# Patient Record
Sex: FEMALE
[De-identification: names, ages, dates, MRNs, and addresses within clinical notes are randomized per-mention and may not be internally consistent; named-entity substitution may affect disease eponyms.]

---

## 2017-03-25 ENCOUNTER — APPOINTMENT (OUTPATIENT)
Dept: FAMILY MEDICINE | Facility: CLINIC | Age: 27
End: 2017-03-25

## 2017-03-25 VITALS
OXYGEN SATURATION: 98 % | WEIGHT: 140 LBS | BODY MASS INDEX: 22.5 KG/M2 | HEART RATE: 89 BPM | HEIGHT: 66 IN | RESPIRATION RATE: 12 BRPM | SYSTOLIC BLOOD PRESSURE: 118 MMHG | DIASTOLIC BLOOD PRESSURE: 70 MMHG

## 2017-03-25 RX ORDER — HYDROCORTISONE 1 %
12 CREAM (GRAM) TOPICAL TWICE DAILY
Qty: 1 | Refills: 5 | Status: ACTIVE | COMMUNITY
Start: 2017-03-25 | End: 1900-01-01

## 2017-04-17 ENCOUNTER — OTHER (OUTPATIENT)
Age: 27
End: 2017-04-17

## 2018-01-05 RX ORDER — VALACYCLOVIR 1 G/1
1 TABLET, FILM COATED ORAL 3 TIMES DAILY
Qty: 30 | Refills: 3 | Status: ACTIVE | COMMUNITY
Start: 2018-01-05 | End: 1900-01-01

## 2018-06-11 ENCOUNTER — APPOINTMENT (OUTPATIENT)
Dept: FAMILY MEDICINE | Facility: CLINIC | Age: 28
End: 2018-06-11
Payer: COMMERCIAL

## 2018-06-11 VITALS
RESPIRATION RATE: 13 BRPM | TEMPERATURE: 98.5 F | SYSTOLIC BLOOD PRESSURE: 120 MMHG | BODY MASS INDEX: 22.5 KG/M2 | WEIGHT: 140 LBS | DIASTOLIC BLOOD PRESSURE: 72 MMHG | OXYGEN SATURATION: 97 % | HEART RATE: 102 BPM | HEIGHT: 66 IN

## 2018-06-11 DIAGNOSIS — K29.50 UNSPECIFIED CHRONIC GASTRITIS W/OUT BLEEDING: ICD-10-CM

## 2018-06-11 DIAGNOSIS — R53.83 OTHER FATIGUE: ICD-10-CM

## 2018-06-11 DIAGNOSIS — R73.03 PREDIABETES.: ICD-10-CM

## 2018-06-11 DIAGNOSIS — Q80.9 CONGENITAL ICHTHYOSIS, UNSPECIFIED: ICD-10-CM

## 2018-06-11 DIAGNOSIS — Z87.2 PERSONAL HISTORY OF DISEASES OF THE SKIN AND SUBCUTANEOUS TISSUE: ICD-10-CM

## 2018-06-11 DIAGNOSIS — E55.9 VITAMIN D DEFICIENCY, UNSPECIFIED: ICD-10-CM

## 2018-06-11 DIAGNOSIS — D64.9 ANEMIA, UNSPECIFIED: ICD-10-CM

## 2018-06-11 PROCEDURE — 99395 PREV VISIT EST AGE 18-39: CPT

## 2018-06-11 RX ORDER — BROMPHENIRAMINE MALEATE, PSEUDOEPHEDRINE HYDROCHLORIDE, 2; 30; 10 MG/5ML; MG/5ML; MG/5ML
30-2-10 SYRUP ORAL
Qty: 200 | Refills: 0 | Status: COMPLETED | COMMUNITY
Start: 2017-12-26

## 2018-06-11 RX ORDER — DIPHENHYDRAMINE HCL 50 MG/1
50 CAPSULE ORAL
Qty: 18 | Refills: 0 | Status: COMPLETED | COMMUNITY
Start: 2018-01-03

## 2018-06-11 RX ORDER — PREDNISONE 50 MG/1
50 TABLET ORAL
Qty: 5 | Refills: 0 | Status: COMPLETED | COMMUNITY
Start: 2018-01-03

## 2018-06-11 RX ORDER — OSELTAMIVIR PHOSPHATE 75 MG/1
75 CAPSULE ORAL
Qty: 10 | Refills: 0 | Status: COMPLETED | COMMUNITY
Start: 2018-01-20

## 2018-06-11 RX ORDER — AMOXICILLIN AND CLAVULANATE POTASSIUM 500; 125 MG/1; MG/1
500-125 TABLET, FILM COATED ORAL
Qty: 20 | Refills: 0 | Status: COMPLETED | COMMUNITY
Start: 2017-12-26

## 2018-06-11 RX ORDER — TRIAMCINOLONE ACETONIDE 1 MG/G
0.1 CREAM TOPICAL
Qty: 454 | Refills: 0 | Status: COMPLETED | COMMUNITY
Start: 2018-01-05

## 2018-06-11 NOTE — ASSESSMENT
[FreeTextEntry1] : PE - WNL\par Fatigue/Anemia - Labs for Fatigue and Dysmetabolism.\par Low Vit. D - Vit D Level.\par

## 2018-06-11 NOTE — PAST MEDICAL HISTORY
[Menstruating] : menstruating [Normal Amount/Duration] : it was of a normal amount and duration [Regular Cycle Intervals] : have been regular

## 2018-06-11 NOTE — HEALTH RISK ASSESSMENT
[Very Good] : ~his/her~  mood as very good [No falls in past year] : Patient reported no falls in the past year [0] : 2) Feeling down, depressed, or hopeless: Not at all (0) [Patient declined mammogram] : Patient declined mammogram [Patient reported PAP Smear was normal] : Patient reported PAP Smear was normal [Patient declined bone density test] : Patient declined bone density test [Patient declined colonoscopy] : Patient declined colonoscopy [HIV test declined] : HIV test declined [Hepatitis C test declined] : Hepatitis C test declined [None] : None [With Significant Other] : lives with significant other [# of Members in Household ___] :  household currently consist of [unfilled] member(s) [Employed] : employed [College] : College [Significant Other] : lives with significant other [# Of Children ___] : has [unfilled] children [Sexually Active] : sexually active [Feels Safe at Home] : Feels safe at home [Fully functional (bathing, dressing, toileting, transferring, walking, feeding)] : Fully functional (bathing, dressing, toileting, transferring, walking, feeding) [Fully functional (using the telephone, shopping, preparing meals, housekeeping, doing laundry, using] : Fully functional and needs no help or supervision to perform IADLs (using the telephone, shopping, preparing meals, housekeeping, doing laundry, using transportation, managing medications and managing finances) [Smoke Detector] : smoke detector [Carbon Monoxide Detector] : carbon monoxide detector [Safety elements used in home] : safety elements used in home [Discussed at today's visit] : Advance Directives Discussed at today's visit [No changes since last discussed ___] : No changes since last discussed  [unfilled] [Seat Belt] :  uses seat belt [Sunscreen] : uses sunscreen [] : No [Change in mental status noted] : No change in mental status noted [Language] : denies difficulty with language [Behavior] : denies difficulty with behavior [Learning/Retaining New Information] : denies difficulty learning/retaining new information [Handling Complex Tasks] : denies difficulty handling complex tasks [Reasoning] : denies difficulty with reasoning [Spatial Ability and Orientation] : denies difficulty with spatial ability and orientation [High Risk Behavior] : no high risk behavior [Reports changes in hearing] : Reports no changes in hearing [Reports changes in vision] : Reports no changes in vision [Reports changes in dental health] : Reports no changes in dental health [Guns at Home] : no guns at home [Travel to Developing Areas] : does not  travel to developing areas [TB Exposure] : is not being exposed to tuberculosis [Caregiver Concerns] : does not have caregiver concerns [PapSmearDate] : 03/18

## 2018-08-08 DIAGNOSIS — Z00.00 ENCOUNTER FOR GENERAL ADULT MEDICAL EXAMINATION W/OUT ABNORMAL FINDINGS: ICD-10-CM

## 2018-10-20 ENCOUNTER — RESULT REVIEW (OUTPATIENT)
Age: 28
End: 2018-10-20

## 2018-11-07 LAB
HBA1C MFR BLD HPLC: 5.5
HBA1C MFR BLD: 5.5
HBA1C MFR BLD: 5.5
MICROALBUMIN 24H UR DL<=1MG/L-MCNC: 7.4
MICROALBUMIN UR-MCNC: 7.4
MICROALBUMIN UR-MCNC: 7.4

## 2018-11-08 DIAGNOSIS — R05 COUGH: ICD-10-CM

## 2018-11-08 LAB
MICROALBUMIN 24H UR DL<=1MG/L-MCNC: 7.4
MICROALBUMIN/CREAT 24H UR-RTO: 3.3

## 2018-11-26 PROBLEM — R05 COUGH: Status: ACTIVE | Noted: 2018-11-26

## 2018-11-26 RX ORDER — AZITHROMYCIN 250 MG/1
250 TABLET, FILM COATED ORAL
Qty: 1 | Refills: 2 | Status: ACTIVE | COMMUNITY
Start: 2018-11-26 | End: 1900-01-01

## 2019-05-24 DIAGNOSIS — Z01.84 ENCOUNTER FOR ANTIBODY RESPONSE EXAMINATION: ICD-10-CM

## 2019-07-09 PROBLEM — Z01.84 IMMUNITY STATUS TESTING: Status: ACTIVE | Noted: 2019-07-09

## 2019-11-27 PROBLEM — Q80.9 ICHTHYOSIS: Status: ACTIVE | Noted: 2017-03-25
